# Patient Record
Sex: FEMALE | Race: WHITE | Employment: UNEMPLOYED | URBAN - METROPOLITAN AREA
[De-identification: names, ages, dates, MRNs, and addresses within clinical notes are randomized per-mention and may not be internally consistent; named-entity substitution may affect disease eponyms.]

---

## 2017-11-17 RX ORDER — VITS A,C,E/LUTEIN/MINERALS 300MCG-200
1 TABLET ORAL DAILY
COMMUNITY

## 2017-11-17 RX ORDER — MULTIVIT WITH MINERALS/LUTEIN
1 TABLET ORAL DAILY
COMMUNITY

## 2017-11-17 RX ORDER — HYDROCHLOROTHIAZIDE 12.5 MG/1
12.5 CAPSULE, GELATIN COATED ORAL EVERY MORNING
COMMUNITY

## 2017-11-17 RX ORDER — ASPIRIN 81 MG/1
81 TABLET ORAL DAILY
COMMUNITY

## 2017-11-17 RX ORDER — ENALAPRIL MALEATE 20 MG/1
20 TABLET ORAL 2 TIMES DAILY
COMMUNITY

## 2017-11-17 RX ORDER — VITAMIN B COMPLEX
1 CAPSULE ORAL DAILY
COMMUNITY

## 2017-11-20 ENCOUNTER — ANESTHESIA (OUTPATIENT)
Dept: CARDIAC CATH/INVASIVE PROCEDURES | Age: 82
End: 2017-11-20
Payer: MEDICARE

## 2017-11-20 ENCOUNTER — HOSPITAL ENCOUNTER (OUTPATIENT)
Dept: CARDIAC CATH/INVASIVE PROCEDURES | Age: 82
Discharge: HOME | End: 2017-11-20
Attending: INTERNAL MEDICINE | Admitting: INTERNAL MEDICINE
Payer: MEDICARE

## 2017-11-20 ENCOUNTER — ANESTHESIA EVENT (OUTPATIENT)
Dept: CARDIAC CATH/INVASIVE PROCEDURES | Age: 82
End: 2017-11-20
Payer: MEDICARE

## 2017-11-20 VITALS
SYSTOLIC BLOOD PRESSURE: 100 MMHG | DIASTOLIC BLOOD PRESSURE: 58 MMHG | RESPIRATION RATE: 18 BRPM | OXYGEN SATURATION: 96 %

## 2017-11-20 VITALS
BODY MASS INDEX: 24.75 KG/M2 | WEIGHT: 145 LBS | TEMPERATURE: 98.1 F | RESPIRATION RATE: 16 BRPM | SYSTOLIC BLOOD PRESSURE: 129 MMHG | OXYGEN SATURATION: 97 % | DIASTOLIC BLOOD PRESSURE: 102 MMHG | HEART RATE: 84 BPM | HEIGHT: 64 IN

## 2017-11-20 DIAGNOSIS — I48.19 PERSISTENT ATRIAL FIBRILLATION (HCC): Primary | ICD-10-CM

## 2017-11-20 PROCEDURE — 7100000011 HC PHASE II RECOVERY - ADDTL 15 MIN

## 2017-11-20 PROCEDURE — 3700000001 HC ADD 15 MINUTES (ANESTHESIA)

## 2017-11-20 PROCEDURE — 6360000002 HC RX W HCPCS: Performed by: SPECIALIST

## 2017-11-20 PROCEDURE — 92960 CARDIOVERSION ELECTRIC EXT: CPT | Performed by: INTERNAL MEDICINE

## 2017-11-20 PROCEDURE — 93005 ELECTROCARDIOGRAM TRACING: CPT

## 2017-11-20 PROCEDURE — 2580000003 HC RX 258: Performed by: INTERNAL MEDICINE

## 2017-11-20 PROCEDURE — 7100000010 HC PHASE II RECOVERY - FIRST 15 MIN

## 2017-11-20 PROCEDURE — 3700000000 HC ANESTHESIA ATTENDED CARE

## 2017-11-20 RX ORDER — DIPHENHYDRAMINE HYDROCHLORIDE 50 MG/ML
12.5 INJECTION INTRAMUSCULAR; INTRAVENOUS
Status: ACTIVE | OUTPATIENT
Start: 2017-11-20 | End: 2017-11-20

## 2017-11-20 RX ORDER — LABETALOL HYDROCHLORIDE 5 MG/ML
5 INJECTION, SOLUTION INTRAVENOUS EVERY 10 MIN PRN
Status: DISCONTINUED | OUTPATIENT
Start: 2017-11-20 | End: 2017-11-21 | Stop reason: HOSPADM

## 2017-11-20 RX ORDER — HYDROMORPHONE HCL 110MG/55ML
0.5 PATIENT CONTROLLED ANALGESIA SYRINGE INTRAVENOUS EVERY 5 MIN PRN
Status: DISCONTINUED | OUTPATIENT
Start: 2017-11-20 | End: 2017-11-21 | Stop reason: HOSPADM

## 2017-11-20 RX ORDER — ONDANSETRON 2 MG/ML
4 INJECTION INTRAMUSCULAR; INTRAVENOUS
Status: ACTIVE | OUTPATIENT
Start: 2017-11-20 | End: 2017-11-20

## 2017-11-20 RX ORDER — FENTANYL CITRATE 50 UG/ML
25 INJECTION, SOLUTION INTRAMUSCULAR; INTRAVENOUS EVERY 5 MIN PRN
Status: DISCONTINUED | OUTPATIENT
Start: 2017-11-20 | End: 2017-11-21 | Stop reason: HOSPADM

## 2017-11-20 RX ORDER — HYDRALAZINE HYDROCHLORIDE 20 MG/ML
5 INJECTION INTRAMUSCULAR; INTRAVENOUS EVERY 10 MIN PRN
Status: DISCONTINUED | OUTPATIENT
Start: 2017-11-20 | End: 2017-11-21 | Stop reason: HOSPADM

## 2017-11-20 RX ORDER — DEXAMETHASONE SODIUM PHOSPHATE 10 MG/ML
4 INJECTION INTRAMUSCULAR; INTRAVENOUS
Status: ACTIVE | OUTPATIENT
Start: 2017-11-20 | End: 2017-11-20

## 2017-11-20 RX ORDER — SODIUM CHLORIDE 9 MG/ML
INJECTION, SOLUTION INTRAVENOUS CONTINUOUS
Status: DISCONTINUED | OUTPATIENT
Start: 2017-11-20 | End: 2017-11-21 | Stop reason: HOSPADM

## 2017-11-20 RX ORDER — MEPERIDINE HYDROCHLORIDE 25 MG/ML
12.5 INJECTION INTRAMUSCULAR; INTRAVENOUS; SUBCUTANEOUS EVERY 5 MIN PRN
Status: DISCONTINUED | OUTPATIENT
Start: 2017-11-20 | End: 2017-11-21 | Stop reason: HOSPADM

## 2017-11-20 RX ORDER — PROPOFOL 10 MG/ML
INJECTION, EMULSION INTRAVENOUS PRN
Status: DISCONTINUED | OUTPATIENT
Start: 2017-11-20 | End: 2017-11-20 | Stop reason: SDUPTHER

## 2017-11-20 RX ADMIN — PROPOFOL 40 MG: 10 INJECTION, EMULSION INTRAVENOUS at 14:05

## 2017-11-20 RX ADMIN — SODIUM CHLORIDE: 9 INJECTION, SOLUTION INTRAVENOUS at 14:00

## 2017-11-20 ASSESSMENT — PAIN - FUNCTIONAL ASSESSMENT: PAIN_FUNCTIONAL_ASSESSMENT: 0-10

## 2017-11-20 NOTE — ANESTHESIA PRE PROCEDURE
Topics    Smoking status: Never Smoker    Smokeless tobacco: Never Used    Alcohol use No                                Counseling given: Not Answered      Vital Signs (Current):   Vitals:    11/20/17 1238 11/20/17 1323   BP: 113/70    Pulse: 74    Resp: 16    Temp: 98.1 °F (36.7 °C)    TempSrc: Oral    SpO2: 95%    Weight:  145 lb (65.8 kg)   Height:  5' 4\" (1.626 m)                                              BP Readings from Last 3 Encounters:   11/20/17 113/70       NPO Status: Time of last liquid consumption: 2100                        Time of last solid consumption: 2330                        Date of last liquid consumption: 11/19/17                        Date of last solid food consumption: 11/19/17    BMI:   Wt Readings from Last 3 Encounters:   11/20/17 145 lb (65.8 kg)     Body mass index is 24.89 kg/m². CBC: No results found for: WBC, RBC, HGB, HCT, MCV, RDW, PLT    CMP: No results found for: NA, K, CL, CO2, BUN, CREATININE, GFRAA, AGRATIO, LABGLOM, GLUCOSE, PROT, CALCIUM, BILITOT, ALKPHOS, AST, ALT    POC Tests: No results for input(s): POCGLU, POCNA, POCK, POCCL, POCBUN, POCHEMO, POCHCT in the last 72 hours.     Coags: No results found for: PROTIME, INR, APTT    HCG (If Applicable): No results found for: PREGTESTUR, PREGSERUM, HCG, HCGQUANT     ABGs: No results found for: PHART, PO2ART, ZQD1JIM, GKN8JTY, BEART, A5SVMETF     Type & Screen (If Applicable):  No results found for: [facility]    Anesthesia Evaluation  Patient summary reviewed and Nursing notes reviewed  Airway: Mallampati: II  TM distance: >3 FB   Neck ROM: full  Mouth opening: > = 3 FB Dental: normal exam   (+) partials      Pulmonary:normal exam  breath sounds clear to auscultation                             Cardiovascular:  Exercise tolerance: good (>4 METS),         ECG reviewed  Rhythm: regular  Rate: normal  Echocardiogram reviewed                  Neuro/Psych:               GI/Hepatic/Renal:             Endo/Other: Abdominal:       Abdomen: soft. Vascular:                                      Anesthesia Plan      general and MAC     ASA 3       Induction: intravenous. Anesthetic plan and risks discussed with patient. Use of blood products discussed with patient whom consented to blood products. Plan discussed with attending and CRNA.                   Darian Lee MD   11/20/2017

## 2017-11-20 NOTE — OP NOTE
Operative report:    Procedure: Synchronized electrical cardioversion. Preoperative diagnosis: Persistent atrial fibrillation. Postoperative diagnosis: Successful synchronized cardioversion restoring sinus rhythm. History: Miss Desiree Chau is an 61-year-old female with persistent atrial fibrillation presents for cardioversion she has been on Shea Ingles for or 3 weeks and adequately anticoagulated. Technique of the procedure: After obtaining written informed consent patient was brought to the EP lab living area where she was prepped and draped in the usual fashion. IV sedation was administered by Department of anesthesia. After adequate sedation cigarettes DC cardioversion was performed with 360 J biphasic defibrillator and cardiac restoring sinus rhythm patient tolerated the procedure well without immediate complications.     Complications:none    Routine postoperative care will be instituted outpatient follow-up has been arranged

## 2017-11-20 NOTE — BRIEF OP NOTE
Brief Postoperative Note  ______________________________________________________________    Patient: Jian Vargas  YOB: 1930  MRN: [id number]  Date of Procedure: 11/20/2017    Pre-Op Diagnosis: ATRIAL FIBRILLATION. Post-Op Diagnosis: SUCCESSFUL DC-CV TO SINUS RHYTHM. Anesthesia: PER DEPT OF ANESTHESIA. Staff:  CLOVER Ramires MD.    Estimated Blood Loss: N/A. Complications:NONE              Findings: Successful synchronous cardioversion restoring sinus rhythm.   Routine postoperative care will be instituted OUT patient follow-up has been arranged  Marcos Woodall MD  Date: 11/20/2017  Time: 2:20 PM

## 2017-11-21 LAB
EKG ATRIAL RATE: 89 BPM
EKG ATRIAL RATE: 94 BPM
EKG P AXIS: 33 DEGREES
EKG P-R INTERVAL: 208 MS
EKG Q-T INTERVAL: 390 MS
EKG Q-T INTERVAL: 396 MS
EKG QRS DURATION: 120 MS
EKG QRS DURATION: 128 MS
EKG QTC CALCULATION (BAZETT): 481 MS
EKG QTC CALCULATION (BAZETT): 492 MS
EKG R AXIS: -57 DEGREES
EKG R AXIS: -60 DEGREES
EKG T AXIS: -22 DEGREES
EKG T AXIS: 5 DEGREES
EKG VENTRICULAR RATE: 89 BPM
EKG VENTRICULAR RATE: 96 BPM